# Patient Record
Sex: MALE | Race: ASIAN | Employment: PART TIME | URBAN - METROPOLITAN AREA
[De-identification: names, ages, dates, MRNs, and addresses within clinical notes are randomized per-mention and may not be internally consistent; named-entity substitution may affect disease eponyms.]

---

## 2020-10-17 ENCOUNTER — HOSPITAL ENCOUNTER (OUTPATIENT)
Facility: CLINIC | Age: 35
Setting detail: OBSERVATION
Discharge: HOME | End: 2020-10-18
Attending: EMERGENCY MEDICINE | Admitting: EMERGENCY MEDICINE
Payer: COMMERCIAL

## 2020-10-17 DIAGNOSIS — L03.317 CELLULITIS OF BUTTOCK: ICD-10-CM

## 2020-10-17 DIAGNOSIS — L02.31 ABSCESS OF LEFT BUTTOCK: Primary | ICD-10-CM

## 2020-10-17 PROBLEM — L08.9 SKIN INFECTION: Status: ACTIVE | Noted: 2020-10-17

## 2020-10-17 LAB
ANION GAP SERPL CALCULATED.3IONS-SCNC: 4 MMOL/L (ref 3–14)
BASOPHILS # BLD AUTO: 0.1 10E9/L (ref 0–0.2)
BASOPHILS NFR BLD AUTO: 0.6 %
BUN SERPL-MCNC: 12 MG/DL (ref 7–30)
CALCIUM SERPL-MCNC: 8 MG/DL (ref 8.5–10.1)
CHLORIDE SERPL-SCNC: 107 MMOL/L (ref 94–109)
CO2 SERPL-SCNC: 25 MMOL/L (ref 20–32)
CREAT SERPL-MCNC: 0.86 MG/DL (ref 0.66–1.25)
CRP SERPL-MCNC: 46 MG/L (ref 0–8)
DIFFERENTIAL METHOD BLD: ABNORMAL
EOSINOPHIL # BLD AUTO: 0.5 10E9/L (ref 0–0.7)
EOSINOPHIL NFR BLD AUTO: 4.2 %
ERYTHROCYTE [DISTWIDTH] IN BLOOD BY AUTOMATED COUNT: 13.5 % (ref 10–15)
GFR SERPL CREATININE-BSD FRML MDRD: >90 ML/MIN/{1.73_M2}
GLUCOSE SERPL-MCNC: 100 MG/DL (ref 70–99)
HCT VFR BLD AUTO: 43 % (ref 40–53)
HGB BLD-MCNC: 13.2 G/DL (ref 13.3–17.7)
IMM GRANULOCYTES # BLD: 0.1 10E9/L (ref 0–0.4)
IMM GRANULOCYTES NFR BLD: 0.6 %
LACTATE BLD-SCNC: 1 MMOL/L (ref 0.7–2)
LYMPHOCYTES # BLD AUTO: 1.3 10E9/L (ref 0.8–5.3)
LYMPHOCYTES NFR BLD AUTO: 11 %
MCH RBC QN AUTO: 26.3 PG (ref 26.5–33)
MCHC RBC AUTO-ENTMCNC: 30.7 G/DL (ref 31.5–36.5)
MCV RBC AUTO: 86 FL (ref 78–100)
MONOCYTES # BLD AUTO: 1 10E9/L (ref 0–1.3)
MONOCYTES NFR BLD AUTO: 7.9 %
NEUTROPHILS # BLD AUTO: 9.2 10E9/L (ref 1.6–8.3)
NEUTROPHILS NFR BLD AUTO: 75.7 %
NRBC # BLD AUTO: 0 10*3/UL
NRBC BLD AUTO-RTO: 0 /100
PLATELET # BLD AUTO: 265 10E9/L (ref 150–450)
POTASSIUM SERPL-SCNC: 3.7 MMOL/L (ref 3.4–5.3)
RBC # BLD AUTO: 5.02 10E12/L (ref 4.4–5.9)
SODIUM SERPL-SCNC: 136 MMOL/L (ref 133–144)
WBC # BLD AUTO: 12.1 10E9/L (ref 4–11)

## 2020-10-17 PROCEDURE — G0378 HOSPITAL OBSERVATION PER HR: HCPCS

## 2020-10-17 PROCEDURE — 99220 PR INITIAL OBSERVATION CARE,LEVEL III: CPT | Performed by: NURSE PRACTITIONER

## 2020-10-17 PROCEDURE — 83605 ASSAY OF LACTIC ACID: CPT | Performed by: EMERGENCY MEDICINE

## 2020-10-17 PROCEDURE — 99285 EMERGENCY DEPT VISIT HI MDM: CPT | Mod: 25 | Performed by: EMERGENCY MEDICINE

## 2020-10-17 PROCEDURE — 250N000009 HC RX 250: Performed by: SURGERY

## 2020-10-17 PROCEDURE — 10061 I&D ABSCESS COMP/MULTIPLE: CPT | Mod: GC | Performed by: SURGERY

## 2020-10-17 PROCEDURE — 96365 THER/PROPH/DIAG IV INF INIT: CPT | Performed by: EMERGENCY MEDICINE

## 2020-10-17 PROCEDURE — 85025 COMPLETE CBC W/AUTO DIFF WBC: CPT | Performed by: EMERGENCY MEDICINE

## 2020-10-17 PROCEDURE — 96376 TX/PRO/DX INJ SAME DRUG ADON: CPT | Performed by: EMERGENCY MEDICINE

## 2020-10-17 PROCEDURE — 36415 COLL VENOUS BLD VENIPUNCTURE: CPT | Performed by: EMERGENCY MEDICINE

## 2020-10-17 PROCEDURE — 250N000011 HC RX IP 250 OP 636: Performed by: EMERGENCY MEDICINE

## 2020-10-17 PROCEDURE — 76857 US EXAM PELVIC LIMITED: CPT | Mod: 26 | Performed by: EMERGENCY MEDICINE

## 2020-10-17 PROCEDURE — 86140 C-REACTIVE PROTEIN: CPT | Performed by: EMERGENCY MEDICINE

## 2020-10-17 PROCEDURE — 250N000013 HC RX MED GY IP 250 OP 250 PS 637: Performed by: NURSE PRACTITIONER

## 2020-10-17 PROCEDURE — 80048 BASIC METABOLIC PNL TOTAL CA: CPT | Performed by: EMERGENCY MEDICINE

## 2020-10-17 PROCEDURE — 76857 US EXAM PELVIC LIMITED: CPT | Performed by: EMERGENCY MEDICINE

## 2020-10-17 PROCEDURE — 96375 TX/PRO/DX INJ NEW DRUG ADDON: CPT | Performed by: EMERGENCY MEDICINE

## 2020-10-17 RX ORDER — LISINOPRIL 20 MG/1
40 TABLET ORAL DAILY
Status: DISCONTINUED | OUTPATIENT
Start: 2020-10-17 | End: 2020-10-18 | Stop reason: HOSPADM

## 2020-10-17 RX ORDER — OXYCODONE HYDROCHLORIDE 5 MG/1
5-10 TABLET ORAL
Status: DISCONTINUED | OUTPATIENT
Start: 2020-10-17 | End: 2020-10-18 | Stop reason: HOSPADM

## 2020-10-17 RX ORDER — ACETAMINOPHEN 325 MG/1
650 TABLET ORAL EVERY 4 HOURS PRN
Status: DISCONTINUED | OUTPATIENT
Start: 2020-10-17 | End: 2020-10-18 | Stop reason: HOSPADM

## 2020-10-17 RX ORDER — ACETAMINOPHEN 650 MG/1
650 SUPPOSITORY RECTAL EVERY 4 HOURS PRN
Status: DISCONTINUED | OUTPATIENT
Start: 2020-10-17 | End: 2020-10-18 | Stop reason: HOSPADM

## 2020-10-17 RX ORDER — LIDOCAINE 40 MG/G
CREAM TOPICAL
Status: DISCONTINUED | OUTPATIENT
Start: 2020-10-17 | End: 2020-10-18 | Stop reason: HOSPADM

## 2020-10-17 RX ORDER — HYDROMORPHONE HYDROCHLORIDE 1 MG/ML
0.5 INJECTION, SOLUTION INTRAMUSCULAR; INTRAVENOUS; SUBCUTANEOUS
Status: COMPLETED | OUTPATIENT
Start: 2020-10-17 | End: 2020-10-17

## 2020-10-17 RX ORDER — HYDROMORPHONE HYDROCHLORIDE 1 MG/ML
0.5 INJECTION, SOLUTION INTRAMUSCULAR; INTRAVENOUS; SUBCUTANEOUS ONCE
Status: COMPLETED | OUTPATIENT
Start: 2020-10-17 | End: 2020-10-17

## 2020-10-17 RX ORDER — ONDANSETRON 4 MG/1
4 TABLET, ORALLY DISINTEGRATING ORAL EVERY 6 HOURS PRN
Status: DISCONTINUED | OUTPATIENT
Start: 2020-10-17 | End: 2020-10-18 | Stop reason: HOSPADM

## 2020-10-17 RX ORDER — NALOXONE HYDROCHLORIDE 0.4 MG/ML
.1-.4 INJECTION, SOLUTION INTRAMUSCULAR; INTRAVENOUS; SUBCUTANEOUS
Status: DISCONTINUED | OUTPATIENT
Start: 2020-10-17 | End: 2020-10-18 | Stop reason: HOSPADM

## 2020-10-17 RX ORDER — ONDANSETRON 2 MG/ML
4 INJECTION INTRAMUSCULAR; INTRAVENOUS EVERY 6 HOURS PRN
Status: DISCONTINUED | OUTPATIENT
Start: 2020-10-17 | End: 2020-10-18 | Stop reason: HOSPADM

## 2020-10-17 RX ORDER — LISINOPRIL 40 MG/1
40 TABLET ORAL DAILY
COMMUNITY

## 2020-10-17 RX ORDER — LIDOCAINE HYDROCHLORIDE AND EPINEPHRINE 10; 10 MG/ML; UG/ML
10 INJECTION, SOLUTION INFILTRATION; PERINEURAL ONCE
Status: COMPLETED | OUTPATIENT
Start: 2020-10-17 | End: 2020-10-17

## 2020-10-17 RX ORDER — VANCOMYCIN HYDROCHLORIDE 1 G/200ML
1000 INJECTION, SOLUTION INTRAVENOUS ONCE
Status: COMPLETED | OUTPATIENT
Start: 2020-10-17 | End: 2020-10-17

## 2020-10-17 RX ADMIN — HYDROMORPHONE HYDROCHLORIDE 0.5 MG: 1 INJECTION, SOLUTION INTRAMUSCULAR; INTRAVENOUS; SUBCUTANEOUS at 14:52

## 2020-10-17 RX ADMIN — ACETAMINOPHEN 650 MG: 325 TABLET, FILM COATED ORAL at 22:50

## 2020-10-17 RX ADMIN — LISINOPRIL 40 MG: 20 TABLET ORAL at 18:15

## 2020-10-17 RX ADMIN — LIDOCAINE HYDROCHLORIDE AND EPINEPHRINE 10 ML: 10; 10 INJECTION, SOLUTION INFILTRATION; PERINEURAL at 18:34

## 2020-10-17 RX ADMIN — VANCOMYCIN HYDROCHLORIDE 1000 MG: 1 INJECTION, SOLUTION INTRAVENOUS at 15:45

## 2020-10-17 RX ADMIN — OXYCODONE HYDROCHLORIDE 10 MG: 5 TABLET ORAL at 22:50

## 2020-10-17 RX ADMIN — HYDROMORPHONE HYDROCHLORIDE 0.5 MG: 1 INJECTION, SOLUTION INTRAMUSCULAR; INTRAVENOUS; SUBCUTANEOUS at 15:31

## 2020-10-17 ASSESSMENT — ENCOUNTER SYMPTOMS
NAUSEA: 0
FEVER: 1
ARTHRALGIAS: 0
SHORTNESS OF BREATH: 0
BRUISES/BLEEDS EASILY: 0
NECK STIFFNESS: 0
ABDOMINAL PAIN: 0
DIFFICULTY URINATING: 0
POLYDIPSIA: 0
DYSURIA: 0
COLOR CHANGE: 0
VOMITING: 0
CHILLS: 0
EYE REDNESS: 0
LIGHT-HEADEDNESS: 0
CONFUSION: 0
HEADACHES: 0
ADENOPATHY: 0

## 2020-10-17 ASSESSMENT — MIFFLIN-ST. JEOR
SCORE: 1396.68
SCORE: 1411.2

## 2020-10-17 NOTE — PROGRESS NOTES
Observation Note  Goals:  Tolerating oral antibiotics or has plans for home infusion set up Not Met- have not established expected regimen of antibiotics  Vital signs normal or at patient baseline Met- SBP 140s, patient has history of hypertension  Adequate pain control on oral analgesia Met- patient reports adequate pain control  Infection is improving Not Met- no sign of change in infection  Color, warmth, movement, sensation of affected area or limb is intact or at baseline Met- patient confirms normal CMS in left leg  Return to baseline functional status Not Met- patient unable to ambulate without pain at this time  Safe disposition plan has been identified Not Met- discharge plan has not been established    D/A/I:  Patient A&O x4, denied palpitations, difficulty breathing, SOB, dizziness, and nausea.  Lung sounds clear to auscultation, no abnormal heart sounds noted, bowel sounds normoactive.  Large reddened area noted to area below left buttock, no drainage noted.  Patient reported pain to the site, stated oxycodone given in ED had helped with the pain, but then some of the doctors had palpated the area and pain had increased.  HR 90s, SBP 140s, SaO2 100% on room air.  Ambulated in hallway independently with steady gait.  Surgical resident here to perform I&D of abscess.    P:  Continue to monitor pain, VS, heart rhythm, skin integrity, fluid status, bowel status, cardiac and respiratory status.  Notify care team of changes in patient condition or other concerns.

## 2020-10-17 NOTE — ED PROVIDER NOTES
[place] EMERGENCY DEPARTMENT ([facility])  10/17/20    History     Chief Complaint   Patient presents with     Leg Pain     History was provided by the patient and medical records.        Elver Slater is a 35 year old male with a past medical history of HTN, seizure disorder who presents for evaluation of leg pain and discoloration.  3 days ago, patient reports noticing pain and discoloration on his upper left posterior/lower buttock area.  Patient notes that the discoloration has spread to an area of approximately 10 cm with a small black dot in the middle of reddened area.  His pain is constant pressure-like, moderate, nonradiating, worse with movement, better with rest.  He did not take any medications for symptomatic relief.  Patient notes a T-max of 99.8 earlier today.  Does not report fevers to the previous days.  Patient denies known insect or spider bite to the area.  Patient denies a history of immunosuppression.    Past Medical History:   Diagnosis Date     Hypertension        History reviewed. No pertinent surgical history.    History reviewed. No pertinent family history.    Social History     Tobacco Use     Smoking status: Current Every Day Smoker     Packs/day: 0.50     Smokeless tobacco: Never Used   Substance Use Topics     Alcohol use: Not Currently     Current Facility-Administered Medications   Medication     [START ON 10/18/2020] vancomycin (VANCOCIN) 750 mg in sodium chloride 0.9 % 250 mL intermittent infusion     Current Outpatient Medications   Medication     lisinopril (ZESTRIL) 40 MG tablet      No Known Allergies      Review of Systems   Constitutional: Positive for fever ( subjective). Negative for chills.   HENT: Negative for congestion.    Eyes: Negative for redness.   Respiratory: Negative for shortness of breath.    Cardiovascular: Negative for chest pain.   Gastrointestinal: Negative for abdominal pain, nausea and vomiting.   Endocrine: Negative for polydipsia and polyuria.  "  Genitourinary: Negative for difficulty urinating and dysuria.   Musculoskeletal: Negative for arthralgias and neck stiffness.        Left buttock pain.   Skin: Negative for color change.   Allergic/Immunologic: Negative for immunocompromised state.   Neurological: Negative for light-headedness and headaches.   Hematological: Negative for adenopathy. Does not bruise/bleed easily.   Psychiatric/Behavioral: Negative for confusion.   All other systems reviewed and are negative.    A complete review of systems was performed with pertinent positives and negatives noted in the HPI, and all other systems negative.    Physical Exam   BP: (!) 149/104  Pulse: 100  Temp: 99.8  F (37.7  C)  Resp: 16  Height: 165.1 cm (5' 5\")  Weight: 54.4 kg (120 lb)  SpO2: 100 %  Physical Exam  Vitals signs and nursing note reviewed.   Constitutional:       General: He is not in acute distress.     Appearance: He is not diaphoretic.   HENT:      Head: Normocephalic and atraumatic.   Eyes:      General: No scleral icterus.     Extraocular Movements: Extraocular movements intact.      Conjunctiva/sclera: Conjunctivae normal.   Cardiovascular:      Rate and Rhythm: Normal rate.      Heart sounds: Normal heart sounds.   Pulmonary:      Effort: Pulmonary effort is normal. No respiratory distress.      Breath sounds: Normal breath sounds. No wheezing or rales.   Abdominal:      General: Bowel sounds are normal.      Palpations: Abdomen is soft.      Tenderness: There is no abdominal tenderness. There is no right CVA tenderness, left CVA tenderness or guarding.   Musculoskeletal: Normal range of motion.         General: Tenderness ( left buttock and posterior, proximal thigh) present.      Comments: 13cm x 10cm induration, firmness, and erythema in left lower buttock, tender to touch and extending towards anal and sctoral regions but not involving anus or scrotum. No crepitus.   Skin:     General: Skin is warm.      Findings: Erythema present. No " rash.      Comments: 13cm x 10cm induration, firmness, and erythema in left lower buttock, tender to touch and extending towards anal and sctoral regions but not involving anus or scrotum. No crepitus.   Neurological:      General: No focal deficit present.      Mental Status: He is alert and oriented to person, place, and time.      Cranial Nerves: No cranial nerve deficit.      Coordination: Coordination normal.   Psychiatric:         Mood and Affect: Mood normal.         Behavior: Behavior normal.         Thought Content: Thought content normal.         Judgment: Judgment normal.         ED Course     2:41 PM  The patient was seen and examined by Dr. Leonel Lamb in Room VTA.     Procedures  Results for orders placed during the hospital encounter of 10/17/20   POC US SOFT TISSUE    Impression Limited Soft Tissue Ultrasound, performed and interpreted by me.    Indication:  Skin redness warmth pain. Evaluate for cellulitis vs abscess.     Body location: left lower extremity    Findings:  There is cobblestoning suggestive of cellulitis in the evaluated area. There is no fluid collection to suggest abscess.      IMPRESSION: Cellulitis with possible abscess but no well circumscribed fluid collection.                               Results for orders placed or performed during the hospital encounter of 10/17/20   POC US SOFT TISSUE     Status: None    Impression    Limited Soft Tissue Ultrasound, performed and interpreted by me.    Indication:  Skin redness warmth pain. Evaluate for cellulitis vs abscess.     Body location: left lower extremity    Findings:  There is cobblestoning suggestive of cellulitis in the evaluated area. There is no fluid collection to suggest abscess.      IMPRESSION: Cellulitis with possible abscess but no well circumscribed fluid collection.         CBC with platelets differential     Status: Abnormal   Result Value Ref Range    WBC 12.1 (H) 4.0 - 11.0 10e9/L    RBC Count 5.02 4.4 - 5.9  10e12/L    Hemoglobin 13.2 (L) 13.3 - 17.7 g/dL    Hematocrit 43.0 40.0 - 53.0 %    MCV 86 78 - 100 fl    MCH 26.3 (L) 26.5 - 33.0 pg    MCHC 30.7 (L) 31.5 - 36.5 g/dL    RDW 13.5 10.0 - 15.0 %    Platelet Count 265 150 - 450 10e9/L    Diff Method Automated Method     % Neutrophils 75.7 %    % Lymphocytes 11.0 %    % Monocytes 7.9 %    % Eosinophils 4.2 %    % Basophils 0.6 %    % Immature Granulocytes 0.6 %    Nucleated RBCs 0 0 /100    Absolute Neutrophil 9.2 (H) 1.6 - 8.3 10e9/L    Absolute Lymphocytes 1.3 0.8 - 5.3 10e9/L    Absolute Monocytes 1.0 0.0 - 1.3 10e9/L    Absolute Eosinophils 0.5 0.0 - 0.7 10e9/L    Absolute Basophils 0.1 0.0 - 0.2 10e9/L    Abs Immature Granulocytes 0.1 0 - 0.4 10e9/L    Absolute Nucleated RBC 0.0    Basic metabolic panel     Status: Abnormal   Result Value Ref Range    Sodium 136 133 - 144 mmol/L    Potassium 3.7 3.4 - 5.3 mmol/L    Chloride 107 94 - 109 mmol/L    Carbon Dioxide 25 20 - 32 mmol/L    Anion Gap 4 3 - 14 mmol/L    Glucose 100 (H) 70 - 99 mg/dL    Urea Nitrogen 12 7 - 30 mg/dL    Creatinine 0.86 0.66 - 1.25 mg/dL    GFR Estimate >90 >60 mL/min/[1.73_m2]    GFR Estimate If Black >90 >60 mL/min/[1.73_m2]    Calcium 8.0 (L) 8.5 - 10.1 mg/dL   CRP inflammation     Status: Abnormal   Result Value Ref Range    CRP Inflammation 46.0 (H) 0.0 - 8.0 mg/L     Medications   vancomycin (VANCOCIN) 750 mg in sodium chloride 0.9 % 250 mL intermittent infusion (has no administration in time range)   HYDROmorphone (PF) (DILAUDID) injection 0.5 mg (0.5 mg Intravenous Given 10/17/20 6132)   vancomycin (VANCOCIN) 1000 mg in dextrose 5% 200 mL PREMIX (0 mg Intravenous Stopped 10/17/20 7670)   HYDROmorphone (PF) (DILAUDID) injection 0.5 mg (0.5 mg Intravenous Given 10/17/20 1531)        Assessments & Plan (with Medical Decision Making)   35-year-old man complaining of pain in his left leg and buttock.  Differential diagnosis: Cellulitis, subcutaneous abscess.    After thorough history and  physical exam patient appears to be in mild distress due to pain.  I will treat his pain with IV Dilaudid and obtain laboratory studies.  I performed point-of-care bedside ultrasound which shows significant cobblestoning concerning for of cellulitis, however, no obvious well circumscribed fluid collection that could be easily drained.  Given the size of the affected lesion and delicate area where it is located I do not feel comfortable attempting potential incision and drainage.  I consulted general surgery for their input since there does feel like a subcutaneous abscess in setting of cellulitis that could potentially benefit from drainage.  Spoke to the resident and he will evaluate the patient.    Patient's laboratory studies returned with leukocytosis of 12,100. There is evidence of mild anemia, hemoglobin is 13.2.  Electrolytes show no evidence of dehydration, creatinine is normal at 0.86.  I did treat the patient with a dose of IV vancomycin in the emergency department.  He will be admitted to the observation unit for further management likely cellulitis of the buttock and potential abscess.    5:23 PM  Surgical consult is pending.  Patient will be signed out to my partner awaiting surgical consult.    This part of the medical record was transcribed by Ana Read, Medical Scribe, from a dictation done by Leonel Lamb MD.     I have reviewed the nursing notes. I have reviewed the findings, diagnosis, plan and need for follow up with the patient.    New Prescriptions    No medications on file       Final diagnoses:   Cellulitis of buttock     IPatrice, am serving as a trained medical scribe to document services personally performed by Leonel Lamb MD, based on the provider's statements to me.      ILeonel MD, was physically present and have reviewed and verified the accuracy of this note documented by Patrice Dunn.     --  Leonel Lamb MD  [facility] EMERGENCY  DEPARTMENT  10/17/2020     Leonel Lamb MD  10/17/20 5610

## 2020-10-17 NOTE — ED TRIAGE NOTES
Pt presents with c/o a bump on left upper thigh/lower buttocks, increasing in size and painful x 3 days. EMS reported area of about 10cm with small black dot in middle of reddened area. T99.8.  Does not have any known insect or spider bite. Denies any significant PMH.

## 2020-10-17 NOTE — PROGRESS NOTES
Received RN to RN report from KORI Crooks RN, phone *[phone].  All questions answered at this time.  Patient will go to Observation room 6.

## 2020-10-17 NOTE — CONSULTS
[facility] Surgery Consultation    Sha Slater MRN# [id number]   Age: 35 year old YOB: 1985     Date of Admission:  10/17/2020    Date of Consult:   10/17/20    Reason for consult: Buttock abscess       Requesting service: ED; requesting provider: Dr. Leonel Lamb                   Assessment and Plan:   Assessment:   35yoM, otherwise healthy presenting with 3 days of progressive L buttock pain, with errythema, low grade fever, and leukocytosis, most likely representing an abscess        Plan:   Agree with medicine admission for antibiotics, will plan for bedside I&D      Discussed with staff, Dr. Tang            Chief Complaint:   L buttock abscess         History of Present Illness:   Otherwise healthy 35yoM presenting with three days of left buttock pain.  The patient states that over the last three days he has had progressive left buttock.  He states the pain is a constant sharp pain without alleviating or aggrevating factors.  The patient denies drainage from the area.  He endorses some low grade fevers, but denies chills.  He has never had similar symptoms in the past.            Past Medical History:     Past Medical History:   Diagnosis Date     Hypertension              Past Surgical History:   History reviewed. No pertinent surgical history.          Social History:     Social History     Tobacco Use     Smoking status: Current Every Day Smoker     Packs/day: 0.50     Smokeless tobacco: Never Used   Substance Use Topics     Alcohol use: Not Currently             Family History:   History reviewed. No pertinent family history.             Allergies:   No Known Allergies          Medications:     Current Facility-Administered Medications   Medication     [START ON 10/18/2020] vancomycin (VANCOCIN) 750 mg in sodium chloride 0.9 % 250 mL intermittent infusion     Current Outpatient Medications   Medication Sig     lisinopril (ZESTRIL) 40 MG tablet Take 40 mg by mouth daily                Review of Systems:   A complete review of systems was obtained and was negative except as stated above in the HPI            Physical Exam:   All vitals have been reviewed  Temp:  [99.8  F (37.7  C)] 99.8  F (37.7  C)  Pulse:  [100] 100  Resp:  [16] 16  BP: (142-149)/(104-105) 142/105  SpO2:  [100 %] 100 %  No intake or output data in the 24 hours ending 10/17/20 1732  Physical Exam:  Gen: NAD  HEENT: Normocephalic, atraumatic  CV: RRR  Abdomen: soft, nontender, nondistended, no guarding, no rebound   Ext: WWP  Skin: Left buttock with approximately 3cm area of erythema with induration and fluctuance  Neuro: cranial nerves II-XII grossly intact  Psych: Alert, appropriate         Data:   All laboratory data reviewed    Results:  BMP  Recent Labs   Lab 10/17/20  1448      POTASSIUM 3.7   CHLORIDE 107   CO2 25   BUN 12   CR 0.86   *     CBC  Recent Labs   Lab 10/17/20  1448   WBC 12.1*   HGB 13.2*        LFTNo lab results found in last 7 days.  Recent Labs   Lab 10/17/20  1448   *       Imaging:  US:   Indication:  Skin redness warmth pain. Evaluate for cellulitis vs abscess.     Body location: left lower extremity     Findings:  There is cobblestoning suggestive of cellulitis in the evaluated area. There is no fluid collection to suggest abscess.       IMPRESSION: Cellulitis with possible abscess but no well circumscribed fluid collection.      Patrice Caba MD

## 2020-10-17 NOTE — H&P
"[facility]   History & Physical    Sha Slater MRN# [id number]   Age: 35 year old YOB: 1985     Date of Admission: 10/17/2020    Primary Care Provider: No Ref-Primary, Physician         Chief Complaint:   \"skin infection\"         History of Present Illness:   Elver Slater is a 35 year old male with a past medical history of HTN, seizure disorder who presents for evaluation of leg pain and discoloration.  Per ER MD, \" 3 days ago, patient reports noticing pain and discoloration on his upper left posterior/lower buttock area.  Patient notes that the discoloration has spread to an area of approximately 10 cm with a small black dot in the middle of reddened area.  His pain is constant pressure-like, moderate, nonradiating, worse with movement, better with rest.  He did not take any medications for symptomatic relief.  Patient notes a T-max of 99.8 earlier today.  Does not report fevers to the previous days.  Patient denies known insect or spider bite to the area.  Patient denies a history of immunosuppression.\"  In the ED the patient's vital signs were stable, he had a temp of 99.8F.  Labs: BMP unremarkable, CBC with WBC 12.1.  Hgb 13.2.  Bedside ultrasound showed cellulitis but not well circumscribed fluid collection. Surgery was consulted.  He was given IV dilaudid X 2, Vancomycin.  He was subsequently admitted to the observation unit for cellulitis.          Review of Systems:     All others reviewed and are negative         Past Medical History:     Past Medical History:   Diagnosis Date     Hypertension              Past Surgical History:    History reviewed. No pertinent surgical history.          Family History:   History reviewed. No pertinent family history.          Social History:     Social History     Tobacco Use     Smoking status: Current Every Day Smoker     Packs/day: 0.50     Smokeless tobacco: Never Used   Substance Use Topics     Alcohol use: Not " "Currently    Denies drug use         Medications:   No current facility-administered medications on file prior to encounter.        lisinopril (ZESTRIL) 40 MG tablet, Take 40 mg by mouth daily             Allergies:   No Known Allergies          Physical Exam:   BP (!) 142/105   Pulse 100   Temp 99.8  F (37.7  C) (Oral)   Resp 16   Ht 1.651 m (5' 5\")   Wt 54.4 kg (120 lb)   SpO2 100%   BMI 19.97 kg/m     GENERAL: Alert and oriented x 3. NAD.   HEENT: Anicteric sclera. PERRL. Mucous membranes moist and without lesions.   CV: RRR. S1, S2. No murmurs appreciated.   RESPIRATORY: Effort normal. Lungs CTAB with no wheezing, rales, rhonchi.   GI: Abdomen soft and non distended with normoactive bowel sounds present in all quadrants. No tenderness, rebound, guarding.   MUSCULOSKELETAL: No joint swelling or tenderness. Moves all extremities.   NEUROLOGICAL: No focal deficits. Strength 5/5 bilaterally in upper and lower extremities.   EXTREMITIES: No peripheral edema. Intact bilateral pedal pulses.   SKIN: No jaundice. No rashes. 13 X 10 cm induration, firm, with erythema and tenderness.  No crepitus.  Area outlined         Labs:   CBC:  Recent Labs   Lab Test 10/17/20  1448   WBC 12.1*   RBC 5.02   HGB 13.2*   HCT 43.0   MCV 86   MCH 26.3*   MCHC 30.7*   RDW 13.5          CMP:  Recent Labs   Lab Test 10/17/20  1448      POTASSIUM 3.7   CHLORIDE 107   ELE 8.0*   CO2 25   BUN 12   CR 0.86   *       INR:   No results for input(s): INR in the last 81555 hours.         Imaging:   Limited Soft Tissue Ultrasound, performed and interpreted by me.     Indication:  Skin redness warmth pain. Evaluate for cellulitis vs abscess.     Body location: left lower extremity     Findings:  There is cobblestoning suggestive of cellulitis in the evaluated area. There is no fluid collection to suggest abscess.       IMPRESSION: Cellulitis with possible abscess but no well circumscribed fluid collection.           " Assessment and Plan:   Elver Slater is a 35 year old male with a past medical history of HTN, seizure disorder who presents for evaluation of leg pain and discoloration.    1. Cellulitis: In the ED the patient's vital signs were stable, he had a temp of 99.8F.  Labs: BMP unremarkable, CBC with WBC 12.1.  Hgb 13.2.  Bedside ultrasound showed cellulitis but not well circumscribed fluid collection. Surgery was consulted.  He was given IV dilaudid X 2, Vancomycin.  He was subsequently admitted to the observation unit for cellulitis. Area of cellulitis outlined.  -admit to the observation unit  -pharmacy to dose vancomycin  -surgery consult  -repeat BMP, CBC, CRP in am  -COVID pending  -pain control: tylenol, ibuprofen, oxycodone prn    Chronic medical problems:  #HTN: continue PTA lisinopril        Discussed with Dr. Lamb.    FEN: regular  Prophylaxis: anticipate short stay  Code Status: Full        Amanda Arguello, APRN, CNP  [facility] #[id number]

## 2020-10-17 NOTE — PHARMACY-VANCOMYCIN DOSING SERVICE
Pharmacy Vancomycin Initial Note  Date of Service 2020  Patient's  1985  35 year old, male    Indication: Skin and Soft Tissue Infection    Current estimated CrCl = Estimated Creatinine Clearance: 93.1 mL/min (based on SCr of 0.86 mg/dL).    Creatinine for last 3 days  10/17/2020:  2:48 PM Creatinine 0.86 mg/dL    Recent Vancomycin Level(s) for last 3 days  No results found for requested labs within last 72 hours.      Vancomycin IV Administrations (past 72 hours)      No vancomycin orders with administrations in past 72 hours.                Nephrotoxins and other renal medications (From now, onward)    Start     Dose/Rate Route Frequency Ordered Stop    10/18/20 0400  vancomycin (VANCOCIN) 750 mg in sodium chloride 0.9 % 250 mL intermittent infusion      750 mg  over 90 Minutes Intravenous EVERY 12 HOURS 10/17/20 1532      10/17/20 1600  vancomycin (VANCOCIN) 1000 mg in dextrose 5% 200 mL PREMIX      1,000 mg  200 mL/hr over 1 Hours Intravenous ONCE 10/17/20 1509            Contrast Orders - past 72 hours (72h ago, onward)    None                Plan:  1.  Start vancomycin  1000 mg IV x1 (20 mg/kg), followed by 750 mg IV q12h (14 mg/kg).   2.  Goal Trough Level: 10-15 mg/L   3.  Pharmacy will check trough levels as appropriate in 1-3 Days.    4. Serum creatinine levels will be ordered daily for the first week of therapy and at least twice weekly for subsequent weeks.    5. Childress method utilized to dose vancomycin therapy: Method 2

## 2020-10-18 VITALS
HEART RATE: 85 BPM | TEMPERATURE: 98.5 F | HEIGHT: 65 IN | WEIGHT: 116.8 LBS | SYSTOLIC BLOOD PRESSURE: 111 MMHG | BODY MASS INDEX: 19.46 KG/M2 | DIASTOLIC BLOOD PRESSURE: 72 MMHG | RESPIRATION RATE: 16 BRPM | OXYGEN SATURATION: 100 %

## 2020-10-18 LAB
ANION GAP SERPL CALCULATED.3IONS-SCNC: 5 MMOL/L (ref 3–14)
BASOPHILS # BLD AUTO: 0.1 10E9/L (ref 0–0.2)
BASOPHILS NFR BLD AUTO: 0.5 %
BUN SERPL-MCNC: 13 MG/DL (ref 7–30)
CALCIUM SERPL-MCNC: 7.9 MG/DL (ref 8.5–10.1)
CHLORIDE SERPL-SCNC: 106 MMOL/L (ref 94–109)
CO2 SERPL-SCNC: 25 MMOL/L (ref 20–32)
CREAT SERPL-MCNC: 0.86 MG/DL (ref 0.66–1.25)
CRP SERPL-MCNC: 80 MG/L (ref 0–8)
DIFFERENTIAL METHOD BLD: ABNORMAL
EOSINOPHIL # BLD AUTO: 0.4 10E9/L (ref 0–0.7)
EOSINOPHIL NFR BLD AUTO: 3.1 %
ERYTHROCYTE [DISTWIDTH] IN BLOOD BY AUTOMATED COUNT: 13.4 % (ref 10–15)
GFR SERPL CREATININE-BSD FRML MDRD: >90 ML/MIN/{1.73_M2}
GLUCOSE SERPL-MCNC: 103 MG/DL (ref 70–99)
HCT VFR BLD AUTO: 43.8 % (ref 40–53)
HGB BLD-MCNC: 13.3 G/DL (ref 13.3–17.7)
IMM GRANULOCYTES # BLD: 0 10E9/L (ref 0–0.4)
IMM GRANULOCYTES NFR BLD: 0.3 %
LYMPHOCYTES # BLD AUTO: 1.7 10E9/L (ref 0.8–5.3)
LYMPHOCYTES NFR BLD AUTO: 13.8 %
MCH RBC QN AUTO: 26.1 PG (ref 26.5–33)
MCHC RBC AUTO-ENTMCNC: 30.4 G/DL (ref 31.5–36.5)
MCV RBC AUTO: 86 FL (ref 78–100)
MONOCYTES # BLD AUTO: 1.1 10E9/L (ref 0–1.3)
MONOCYTES NFR BLD AUTO: 8.7 %
NEUTROPHILS # BLD AUTO: 8.9 10E9/L (ref 1.6–8.3)
NEUTROPHILS NFR BLD AUTO: 73.6 %
NRBC # BLD AUTO: 0 10*3/UL
NRBC BLD AUTO-RTO: 0 /100
PLATELET # BLD AUTO: 277 10E9/L (ref 150–450)
POTASSIUM SERPL-SCNC: 3.8 MMOL/L (ref 3.4–5.3)
RBC # BLD AUTO: 5.1 10E12/L (ref 4.4–5.9)
SODIUM SERPL-SCNC: 135 MMOL/L (ref 133–144)
WBC # BLD AUTO: 12.1 10E9/L (ref 4–11)

## 2020-10-18 PROCEDURE — G0378 HOSPITAL OBSERVATION PER HR: HCPCS

## 2020-10-18 PROCEDURE — 99217 PR OBSERVATION CARE DISCHARGE: CPT | Performed by: NURSE PRACTITIONER

## 2020-10-18 PROCEDURE — 96365 THER/PROPH/DIAG IV INF INIT: CPT | Performed by: EMERGENCY MEDICINE

## 2020-10-18 PROCEDURE — 85025 COMPLETE CBC W/AUTO DIFF WBC: CPT | Performed by: NURSE PRACTITIONER

## 2020-10-18 PROCEDURE — 250N000011 HC RX IP 250 OP 636: Performed by: NURSE PRACTITIONER

## 2020-10-18 PROCEDURE — 80048 BASIC METABOLIC PNL TOTAL CA: CPT | Performed by: NURSE PRACTITIONER

## 2020-10-18 PROCEDURE — 96376 TX/PRO/DX INJ SAME DRUG ADON: CPT

## 2020-10-18 PROCEDURE — 86140 C-REACTIVE PROTEIN: CPT | Performed by: NURSE PRACTITIONER

## 2020-10-18 PROCEDURE — 250N000013 HC RX MED GY IP 250 OP 250 PS 637: Performed by: NURSE PRACTITIONER

## 2020-10-18 PROCEDURE — 258N000003 HC RX IP 258 OP 636: Performed by: NURSE PRACTITIONER

## 2020-10-18 PROCEDURE — 36415 COLL VENOUS BLD VENIPUNCTURE: CPT | Performed by: NURSE PRACTITIONER

## 2020-10-18 PROCEDURE — 96376 TX/PRO/DX INJ SAME DRUG ADON: CPT | Performed by: EMERGENCY MEDICINE

## 2020-10-18 RX ORDER — OXYCODONE HYDROCHLORIDE 5 MG/1
5-10 TABLET ORAL EVERY 6 HOURS PRN
Qty: 10 TABLET | Refills: 0 | Status: SHIPPED | OUTPATIENT
Start: 2020-10-18

## 2020-10-18 RX ORDER — SULFAMETHOXAZOLE/TRIMETHOPRIM 800-160 MG
1 TABLET ORAL 2 TIMES DAILY
Qty: 14 TABLET | Refills: 0 | Status: SHIPPED | OUTPATIENT
Start: 2020-10-18 | End: 2020-10-25

## 2020-10-18 RX ADMIN — OXYCODONE HYDROCHLORIDE 10 MG: 5 TABLET ORAL at 05:44

## 2020-10-18 RX ADMIN — LISINOPRIL 40 MG: 20 TABLET ORAL at 07:39

## 2020-10-18 RX ADMIN — ACETAMINOPHEN 650 MG: 325 TABLET, FILM COATED ORAL at 07:39

## 2020-10-18 RX ADMIN — VANCOMYCIN HYDROCHLORIDE 750 MG: 1 INJECTION, POWDER, LYOPHILIZED, FOR SOLUTION INTRAVENOUS at 05:37

## 2020-10-18 NOTE — PLAN OF CARE
"Observation Note  Goals:  Tolerating oral antibiotics or has plans for home infusion set up Not Met- have not established expected regimen of antibiotics  Vital signs normal or at patient baseline Met  Adequate pain control on oral analgesia Met- patient reports adequate pain control  Infection is improving Not Met- no sign of change in infection  Color, warmth, movement, sensation of affected area or limb is intact or at baseline Met- patient confirms normal CMS in left leg  Return to baseline functional status Not Met- patient unable to ambulate without pain at this time  Safe disposition plan has been identified Not Met- discharge plan has not been established      P:  Continue to monitor pain, VS, heart rhythm, skin integrity, fluid status, bowel status, cardiac and respiratory status.  Notify care team of changes in patient condition or other concerns.   I&D at the bedside. Pt tolerated well. Pt declined seizure pads.     BP (!) 140/101 (BP Location: Right arm)   Pulse 90   Temp 98.2  F (36.8  C) (Oral)   Resp 16   Ht 1.651 m (5' 5\")   Wt 53 kg (116 lb 12.8 oz)   SpO2 100%   BMI 19.44 kg/m          "

## 2020-10-18 NOTE — PROGRESS NOTES
"Observation Note  Tolerating oral antibiotics or has plans for home infusion set up: Vanco IV inplace    Vital signs normal or at patient baseline Met     Adequate pain control on oral analgesia Met,  Pt was asleep and woke up with concern of pain. Rated pain at 4 and 10 mg Oxy administered with some relief.    Infection is improving MET-  I/D earlier, reports much improvement in pain    Color, warmth, movement, sensation of affected area or limb is intact or at baseline Met- patient confirms normal CMS in left leg    Return to baseline functional status Not Met- patient unable to ambulate without pain at this time    Safe disposition plan has been identified Not Met- discharge plan has not been established   Sleeping between care. Ate courtesy meal and retained. Left gluteal area marked and area unchanged. Jose has not spread. Dressing has dried blood. Per surgery note dressing to be replaced today.   Blood pressure 125/83, pulse 85, temperature 98.7  F (37.1  C), temperature source Oral, resp. rate 16, height 1.651 m (5' 5\"), weight 53 kg (116 lb 12.8 oz), SpO2 100 %.      "

## 2020-10-18 NOTE — DISCHARGE INSTRUCTIONS
Please try tylenol and ibuprofen first for pain.  If you still have severe pain, then take the oxycodone.

## 2020-10-18 NOTE — DISCHARGE SUMMARY
Discharge instructions reviewed, understood, and signed by patient. VSS, PIV removed, new medications reviewed and understood, patient has all belongings. Patient ambulated off unit. Pt will follow-up with surgery in the clinic.

## 2020-10-18 NOTE — DISCHARGE SUMMARY
Discharge Summary    Sha Slater MRN# [id number]   YOB: 1985 Age: 35 year old     Date of Admission:  10/17/2020  Date of Discharge:  10/18/2020  Admitting Physician:  Shayna Alegria MD  Discharge Physician:  Shayna Alegria MD  Discharging Service:  Emergency Medicine     Primary Provider: No Ref-Primary, Physician          Discharge Diagnosis:   Left gluteal abscess with cellulitis             Discharge Disposition:   Discharged to home           Condition on Discharge:   Discharge condition: Stable   Code status on discharge: Full Code           Procedures:   Invasive procedures: I&D left gluteal abscess           Discharge Medications:     Current Discharge Medication List      START taking these medications    Details   oxyCODONE (ROXICODONE) 5 MG tablet Take 1-2 tablets (5-10 mg) by mouth every 6 hours as needed for severe pain  Qty: 10 tablet, Refills: 0    Associated Diagnoses: Abscess of left buttock      sulfamethoxazole-trimethoprim (BACTRIM DS) 800-160 MG tablet Take 1 tablet by mouth 2 times daily for 7 days  Qty: 14 tablet, Refills: 0    Associated Diagnoses: Abscess of left buttock         CONTINUE these medications which have NOT CHANGED    Details   lisinopril (ZESTRIL) 40 MG tablet Take 40 mg by mouth daily                   Consultations:   Consultation during this admission received from surgery             Brief History of Illness:   Please see detailed H&P from 10/17/2020, in brief: Elver Slater is a 35 year old male with a past medical history of HTN, seizure disorder who presents for evaluation of leg pain and discoloration.           Hospital Course:   1. Left gluteal abscess and cellulitis: In the ED the patient's vital signs were stable, he had a temp of 99.8F.  Labs: BMP unremarkable, CBC with WBC 12.1.  Hgb 13.2.  Bedside ultrasound showed cellulitis but not well circumscribed fluid collection. Surgery was consulted.  He was given IV dilaudid X 2, Vancomycin.  He was subsequently  "admitted to the observation unit for cellulitis. Area of cellulitis outlined.  Surgery consulted and performed I&D of gluteal abscess on 10/17.  He received two doses of IV Vancomycin while in the observation unit. Today, surgery replaced the packing.  They recommend discharge to home with Bactrim X 1 week, he can remove packing tomorrow and cover with gauze.  Follow up with surgery in one week (referral and # given).  He did have a temp of 101.4F around 2200 last night.  Afebrile since and area of erythema is receding slightly.  WBC 12.1 this am (unchanged), CRP did increase to 80 from 46.  He was instructed on when to return to the ER (ongoing fever for longer than 2 days, worsening pain, worsening redness).  He was in agreement and discharged to home in good condition.   -COVID ordered  -pain control: tylenol, ibuprofen, oxycodone prn  -Bactrim DS 1 tab BID X 1 week  -follow up with surgery in one week     Chronic medical problems:  #HTN: continue PTA lisinopril            Final Day of Progress before Discharge:       Physical Exam:  Blood pressure 111/72, pulse 85, temperature 98.5  F (36.9  C), temperature source Oral, resp. rate 16, height 1.651 m (5' 5\"), weight 53 kg (116 lb 12.8 oz), SpO2 100 %.    EXAM:  Exam:  Constitutional: healthy, alert and no distress  Cardiovascular: No lifts, heaves, or thrills. RRR. No murmurs, clicks gallops or rub  Respiratory: Good diaphragmatic excursion. Lungs clear  Gastrointestinal: Abdomen soft, non-tender. BS normal. No masses, organomegaly  Musculoskeletal: extremities normal- no gross deformities noted, and normal muscle tone  Skin: no suspicious lesions or rashes, left gluteal I&D site with packing in place and surrounding erythema that is receding from previous marked borders.  Tender to touch.  Neurologic: Alert and oriented.   Psychiatric: mentation appears normal and affect normal/bright    /72 (BP Location: Right arm)   Pulse 85   Temp 98.5  F (36.9  C) " "(Oral)   Resp 16   Ht 1.651 m (5' 5\")   Wt 53 kg (116 lb 12.8 oz)   SpO2 100%   BMI 19.44 kg/m               Data:  All laboratory data reviewed             Significant Results:     Results for orders placed or performed during the hospital encounter of 10/17/20   POC US SOFT TISSUE     Status: None    Impression    Limited Soft Tissue Ultrasound, performed and interpreted by me.    Indication:  Skin redness warmth pain. Evaluate for cellulitis vs abscess.     Body location: left lower extremity    Findings:  There is cobblestoning suggestive of cellulitis in the evaluated area. There is no fluid collection to suggest abscess.      IMPRESSION: Cellulitis with possible abscess but no well circumscribed fluid collection.         CBC with platelets differential     Status: Abnormal   Result Value Ref Range    WBC 12.1 (H) 4.0 - 11.0 10e9/L    RBC Count 5.02 4.4 - 5.9 10e12/L    Hemoglobin 13.2 (L) 13.3 - 17.7 g/dL    Hematocrit 43.0 40.0 - 53.0 %    MCV 86 78 - 100 fl    MCH 26.3 (L) 26.5 - 33.0 pg    MCHC 30.7 (L) 31.5 - 36.5 g/dL    RDW 13.5 10.0 - 15.0 %    Platelet Count 265 150 - 450 10e9/L    Diff Method Automated Method     % Neutrophils 75.7 %    % Lymphocytes 11.0 %    % Monocytes 7.9 %    % Eosinophils 4.2 %    % Basophils 0.6 %    % Immature Granulocytes 0.6 %    Nucleated RBCs 0 0 /100    Absolute Neutrophil 9.2 (H) 1.6 - 8.3 10e9/L    Absolute Lymphocytes 1.3 0.8 - 5.3 10e9/L    Absolute Monocytes 1.0 0.0 - 1.3 10e9/L    Absolute Eosinophils 0.5 0.0 - 0.7 10e9/L    Absolute Basophils 0.1 0.0 - 0.2 10e9/L    Abs Immature Granulocytes 0.1 0 - 0.4 10e9/L    Absolute Nucleated RBC 0.0    Basic metabolic panel     Status: Abnormal   Result Value Ref Range    Sodium 136 133 - 144 mmol/L    Potassium 3.7 3.4 - 5.3 mmol/L    Chloride 107 94 - 109 mmol/L    Carbon Dioxide 25 20 - 32 mmol/L    Anion Gap 4 3 - 14 mmol/L    Glucose 100 (H) 70 - 99 mg/dL    Urea Nitrogen 12 7 - 30 mg/dL    Creatinine 0.86 0.66 - " 1.25 mg/dL    GFR Estimate >90 >60 mL/min/[1.73_m2]    GFR Estimate If Black >90 >60 mL/min/[1.73_m2]    Calcium 8.0 (L) 8.5 - 10.1 mg/dL   CRP inflammation     Status: Abnormal   Result Value Ref Range    CRP Inflammation 46.0 (H) 0.0 - 8.0 mg/L   Lactic acid level STAT     Status: None   Result Value Ref Range    Lactate for Sepsis Protocol 1.0 0.7 - 2.0 mmol/L   CBC with platelets differential     Status: Abnormal   Result Value Ref Range    WBC 12.1 (H) 4.0 - 11.0 10e9/L    RBC Count 5.10 4.4 - 5.9 10e12/L    Hemoglobin 13.3 13.3 - 17.7 g/dL    Hematocrit 43.8 40.0 - 53.0 %    MCV 86 78 - 100 fl    MCH 26.1 (L) 26.5 - 33.0 pg    MCHC 30.4 (L) 31.5 - 36.5 g/dL    RDW 13.4 10.0 - 15.0 %    Platelet Count 277 150 - 450 10e9/L    Diff Method Automated Method     % Neutrophils 73.6 %    % Lymphocytes 13.8 %    % Monocytes 8.7 %    % Eosinophils 3.1 %    % Basophils 0.5 %    % Immature Granulocytes 0.3 %    Nucleated RBCs 0 0 /100    Absolute Neutrophil 8.9 (H) 1.6 - 8.3 10e9/L    Absolute Lymphocytes 1.7 0.8 - 5.3 10e9/L    Absolute Monocytes 1.1 0.0 - 1.3 10e9/L    Absolute Eosinophils 0.4 0.0 - 0.7 10e9/L    Absolute Basophils 0.1 0.0 - 0.2 10e9/L    Abs Immature Granulocytes 0.0 0 - 0.4 10e9/L    Absolute Nucleated RBC 0.0    Basic metabolic panel     Status: Abnormal   Result Value Ref Range    Sodium 135 133 - 144 mmol/L    Potassium 3.8 3.4 - 5.3 mmol/L    Chloride 106 94 - 109 mmol/L    Carbon Dioxide 25 20 - 32 mmol/L    Anion Gap 5 3 - 14 mmol/L    Glucose 103 (H) 70 - 99 mg/dL    Urea Nitrogen 13 7 - 30 mg/dL    Creatinine 0.86 0.66 - 1.25 mg/dL    GFR Estimate >90 >60 mL/min/[1.73_m2]    GFR Estimate If Black >90 >60 mL/min/[1.73_m2]    Calcium 7.9 (L) 8.5 - 10.1 mg/dL   CRP inflammation     Status: Abnormal   Result Value Ref Range    CRP Inflammation 80.0 (H) 0.0 - 8.0 mg/L      Recent Results (from the past 48 hour(s))   POC US SOFT TISSUE    Impression    Limited Soft Tissue Ultrasound, performed and  interpreted by me.    Indication:  Skin redness warmth pain. Evaluate for cellulitis vs abscess.     Body location: left lower extremity    Findings:  There is cobblestoning suggestive of cellulitis in the evaluated area. There is no fluid collection to suggest abscess.      IMPRESSION: Cellulitis with possible abscess but no well circumscribed fluid collection.                      Pending Results:   Unresulted Labs Ordered in the Past 30 Days of this Admission     No orders found for last 31 day(s).                  Discharge Instructions and Follow-Up:     Discharge Procedure Orders   General Surg Adult Referral   Standing Status: Future   Referral Priority: Routine Referral Type: Consultation   Requested Specialty: Surgery   Number of Visits Requested: 1     Reason for your hospital stay   Order Comments: You were admitted to the observation unit for a skin infection of your left buttock.  Surgery was consulted and drained a large abscess (pocket of pus/infection), this was packed, you can remove the packing tomorrow and place a gauze over the top.  Surgery would like to see you in their clinic in one week.  You were given IV antibiotics with slow improvement in your infection.  We will continue you on oral antibiotics for one week.     Adult [facility] Follow-up and recommended labs and tests   Order Comments: Follow up with surgery in one week, if you do not hear from them to schedule an appointment, please call #[phone]    Appointments on [facility] and/or [facility] (with [facility] or [facility] provider or service). Call [phone] if you haven't heard regarding these appointments within 7 days of discharge.     Activity   Order Comments: Your activity upon discharge: activity as tolerated, no driving while on analgesics and avoid bathing, hot tubs, swimming pools.  It is ok to shower and let the water wash over your wound area.     Order Specific Question Answer Comments   Is discharge order? Yes      Wound  care and dressings   Order Comments: Instructions to care for your wound at home: remove packing tomorrow and cover with a gauze.  Change the gauze daily.     Diet   Order Comments: Follow this diet upon discharge: Orders Placed This Encounter      Regular Diet Adult     Order Specific Question Answer Comments   Is discharge order? Yes           Attestation:  JAVIER Rushing CNP.

## 2020-10-18 NOTE — PLAN OF CARE
"Observation Goals:   Tolerating oral antibiotics or has plans for home infusion set up: Pt receiving IV abx, Will go home with Oral abx      Vital signs normal or at patient baseline: met      Adequate pain control on oral analgesia: met, PRN tylenol given. Pt still has some pain with ambulating     Infection is improving: in progress     Color, warmth, movement, sensation of affected area or limb is intact or at baseline: met    Return to baseline functional status: not met, pt still has some pain when ambulating     Safe disposition plan has been identified: met     /72 (BP Location: Right arm)   Pulse 85   Temp 98.5  F (36.9  C) (Oral)   Resp 16   Ht 1.651 m (5' 5\")   Wt 53 kg (116 lb 12.8 oz)   SpO2 100%   BMI 19.44 kg/m     "

## 2020-10-18 NOTE — PROCEDURES
Site: [facility] emergency department    Date: 10/17/2020    Procedure: Incision and drainage of left gluteal abscess    Resident; Brain Jack MD    Staff: MD Lorrie    Indication: Elver Campo is a 35-year-old male who presents with 3-day left gluteal pain and erythema.  He thinks that the site may have drained but does not recall any specific color drainage.  No prior interventions for gluteal abscess in the past. US without specific fluid collection, but on exam appears to have localized induration with likely deeper fluid collection.  Denies family history of inflammatory bowel disease or anal drainage.  Discussed with patient  bedside drainage with local anesthetic in the emergency department along with risks and benefits to which he agreed to proceed.      Procedure: Informed consent was obtained.  He was positioned in right lateral decubitus.  The left gluteus was prepped.  20 cc of 1% lidocaine was injected into the proposed incision site over the abscess.  The skin was incised with a #11 blade and return purulent fluid.  A skin ellipse was then made by cutting additional skin from the edges of the wound for a total of 3 cm long incision.  A hemostat was used to spread the underlying tissue which returned additional purulent fluid.  Wound cavity was irrigated with 20 cc of normal saline and was packed with a single strand of half inch string gauze and covered with Primapore dressing.    Plan:  -Okay to discharge from the ED from a surgery perspective  -Remove string gauze tomorrow and replaced with 4 x 4 gauze dressing (does not have to be packed within the wound, rather just overlying the wound to collect drainage)  -Plan for 1 week of Bactrim antibiotic   -Pain medication  -Follow-up in general surgery clinic next week    Discussed with Dr. Kitty Jack MD  PGY 5 General surgery

## 2020-10-18 NOTE — PROGRESS NOTES
"Observation Note  Tolerating oral antibiotics or has plans for home infusion set up Not Met    Vital signs normal or at patient baseline Met, /88 (BP Location: Right arm)   Pulse 107   Temp 98  F (36.7  C) (Oral)   Resp 16   Ht 1.651 m (5' 5\")   Wt 53 kg (116 lb 12.8 oz)   SpO2 100%   BMI 19.44 kg/m      Adequate pain control on oral analgesia Met- patient reports  Headache, took Tylenol earlier.    Infection is improving MET-  I/D earlier, reports much improvement in pain    Color, warmth, movement, sensation of affected area or limb is intact or at baseline Met- patient confirms normal CMS in left leg    Return to baseline functional status Not Met- patient unable to ambulate without pain at this time    Safe disposition plan has been identified Not Met- discharge plan has not been established  "

## 2020-10-20 ENCOUNTER — PATIENT OUTREACH (OUTPATIENT)
Dept: SURGERY | Facility: CLINIC | Age: 35
End: 2020-10-20

## 2020-10-20 NOTE — PROGRESS NOTES
Patient was seen in the ED for a left buttock abscess.  It was drained by the General Surgery Team.  The patient was asked to follow-up in the outpatient clinic in 1 week.     Attempted to reach patient with no answer. LM on VM to call office. Direct contact information provided.

## 2021-03-02 ENCOUNTER — COMMUNICATION - HEALTHEAST (OUTPATIENT)
Dept: SCHEDULING | Facility: CLINIC | Age: 36
End: 2021-03-02

## 2021-11-18 ENCOUNTER — APPOINTMENT (OUTPATIENT)
Dept: RADIOLOGY | Facility: HOSPITAL | Age: 36
End: 2021-11-18
Payer: COMMERCIAL

## 2021-11-18 ENCOUNTER — HOSPITAL ENCOUNTER (EMERGENCY)
Facility: HOSPITAL | Age: 36
Discharge: HOME | End: 2021-11-18
Attending: STUDENT IN AN ORGANIZED HEALTH CARE EDUCATION/TRAINING PROGRAM | Admitting: STUDENT IN AN ORGANIZED HEALTH CARE EDUCATION/TRAINING PROGRAM
Payer: COMMERCIAL

## 2021-11-18 VITALS
RESPIRATION RATE: 18 BRPM | HEART RATE: 92 BPM | DIASTOLIC BLOOD PRESSURE: 81 MMHG | HEIGHT: 65 IN | WEIGHT: 120 LBS | TEMPERATURE: 96.3 F | OXYGEN SATURATION: 100 % | BODY MASS INDEX: 19.99 KG/M2 | SYSTOLIC BLOOD PRESSURE: 144 MMHG

## 2021-11-18 DIAGNOSIS — S43.006A SHOULDER SEPARATION: ICD-10-CM

## 2021-11-18 PROCEDURE — 73030 X-RAY EXAM OF SHOULDER: CPT | Mod: LT

## 2021-11-18 PROCEDURE — 99283 EMERGENCY DEPT VISIT LOW MDM: CPT

## 2021-11-18 PROCEDURE — 250N000013 HC RX MED GY IP 250 OP 250 PS 637: Performed by: STUDENT IN AN ORGANIZED HEALTH CARE EDUCATION/TRAINING PROGRAM

## 2021-11-18 RX ORDER — IBUPROFEN 800 MG/1
800 TABLET, FILM COATED ORAL EVERY 8 HOURS PRN
Qty: 24 TABLET | Refills: 0 | Status: SHIPPED | OUTPATIENT
Start: 2021-11-18 | End: 2021-11-26

## 2021-11-18 RX ORDER — OXYCODONE AND ACETAMINOPHEN 5; 325 MG/1; MG/1
1 TABLET ORAL ONCE
Status: COMPLETED | OUTPATIENT
Start: 2021-11-18 | End: 2021-11-18

## 2021-11-18 RX ADMIN — OXYCODONE HYDROCHLORIDE AND ACETAMINOPHEN 1 TABLET: 5; 325 TABLET ORAL at 11:45

## 2021-11-18 ASSESSMENT — MIFFLIN-ST. JEOR: SCORE: 1406.2

## 2021-11-18 ASSESSMENT — ENCOUNTER SYMPTOMS
NUMBNESS: 0
NECK PAIN: 1
BACK PAIN: 1
ARTHRALGIAS: 1
ABDOMINAL PAIN: 0
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
## 2021-11-18 NOTE — DISCHARGE INSTRUCTIONS
Please wear the sling and follow up with Orthopedics within the next week.  You have a shoulder separation and will need to follow up with orthopeadics as there is a chance this will need surgery